# Patient Record
Sex: FEMALE
[De-identification: names, ages, dates, MRNs, and addresses within clinical notes are randomized per-mention and may not be internally consistent; named-entity substitution may affect disease eponyms.]

---

## 2017-08-14 ENCOUNTER — APPOINTMENT (OUTPATIENT)
Dept: OPHTHALMOLOGY | Facility: CLINIC | Age: 82
End: 2017-08-14
Payer: MEDICARE

## 2017-08-14 PROCEDURE — 92025 CPTRIZED CORNEAL TOPOGRAPHY: CPT

## 2017-08-14 PROCEDURE — 92002 INTRM OPH EXAM NEW PATIENT: CPT

## 2017-08-14 PROCEDURE — 92134 CPTRZ OPH DX IMG PST SGM RTA: CPT

## 2017-09-01 ENCOUNTER — APPOINTMENT (OUTPATIENT)
Dept: OPHTHALMOLOGY | Facility: CLINIC | Age: 82
End: 2017-09-01
Payer: SELF-PAY

## 2017-09-01 PROCEDURE — 92015A: CUSTOM

## 2017-09-11 ENCOUNTER — APPOINTMENT (OUTPATIENT)
Dept: OPHTHALMOLOGY | Facility: CLINIC | Age: 82
End: 2017-09-11
Payer: MEDICARE

## 2017-09-11 PROCEDURE — 92014 COMPRE OPH EXAM EST PT 1/>: CPT

## 2017-09-11 PROCEDURE — 92134 CPTRZ OPH DX IMG PST SGM RTA: CPT

## 2017-09-18 ENCOUNTER — APPOINTMENT (OUTPATIENT)
Dept: OPHTHALMOLOGY | Facility: CLINIC | Age: 82
End: 2017-09-18

## 2018-06-11 ENCOUNTER — APPOINTMENT (OUTPATIENT)
Dept: OPHTHALMOLOGY | Facility: CLINIC | Age: 83
End: 2018-06-11
Payer: MEDICARE

## 2018-06-11 DIAGNOSIS — H35.372 PUCKERING OF MACULA, LEFT EYE: ICD-10-CM

## 2018-06-11 DIAGNOSIS — H11.823 CONJUNCTIVOCHALASIS, BILATERAL: ICD-10-CM

## 2018-06-11 DIAGNOSIS — H35.371 PUCKERING OF MACULA, RIGHT EYE: ICD-10-CM

## 2018-06-11 DIAGNOSIS — Z96.1 PRESENCE OF INTRAOCULAR LENS: ICD-10-CM

## 2018-06-11 PROCEDURE — 92025 CPTRIZED CORNEAL TOPOGRAPHY: CPT

## 2018-06-11 PROCEDURE — 92012 INTRM OPH EXAM EST PATIENT: CPT

## 2019-05-17 ENCOUNTER — APPOINTMENT (OUTPATIENT)
Dept: OTOLARYNGOLOGY | Facility: CLINIC | Age: 84
End: 2019-05-17
Payer: MEDICARE

## 2019-05-17 VITALS
BODY MASS INDEX: 19.56 KG/M2 | HEIGHT: 64.5 IN | SYSTOLIC BLOOD PRESSURE: 103 MMHG | WEIGHT: 116 LBS | HEART RATE: 73 BPM | DIASTOLIC BLOOD PRESSURE: 64 MMHG

## 2019-05-17 PROCEDURE — 31575 DIAGNOSTIC LARYNGOSCOPY: CPT

## 2019-05-17 PROCEDURE — G0268 REMOVAL OF IMPACTED WAX MD: CPT

## 2019-05-17 PROCEDURE — 92557 COMPREHENSIVE HEARING TEST: CPT

## 2019-05-17 PROCEDURE — 99204 OFFICE O/P NEW MOD 45 MIN: CPT | Mod: 25

## 2019-05-17 RX ORDER — DOCUSATE SODIUM 100 MG/1
CAPSULE ORAL
Refills: 0 | Status: ACTIVE | COMMUNITY

## 2019-05-17 RX ORDER — LINACLOTIDE 72 UG/1
CAPSULE, GELATIN COATED ORAL
Refills: 0 | Status: ACTIVE | COMMUNITY

## 2019-05-17 RX ORDER — LORATADINE 5 MG
TABLET,CHEWABLE ORAL
Refills: 0 | Status: ACTIVE | COMMUNITY

## 2019-05-17 NOTE — PHYSICAL EXAM
[Midline] : trachea located in midline position [Normal] : no rashes [de-identified] : Hyoid bone palpable [de-identified] : right cerumen impaction - removed

## 2019-05-17 NOTE — CONSULT LETTER
[Dear  ___] : Dear  [unfilled], [Courtesy Letter:] : I had the pleasure of seeing your patient, [unfilled], in my office today. [Please see my note below.] : Please see my note below. [FreeTextEntry3] : Tripp Herman MD, JAROCHO, FACS\par  Department Otolaryngology\par Director of [facility]\par Professor of Otolaryngology, \par [employer]\par  [Sincerely,] : Sincerely, [Consult Closing:] : Thank you very much for allowing me to participate in the care of this patient.  If you have any questions, please do not hesitate to contact me.

## 2019-05-17 NOTE — REASON FOR VISIT
[Other: _____] : [unfilled] [Initial Evaluation] : an initial evaluation for [FreeTextEntry2] : former patient of Dr. Herman, here for dry cough, reflux

## 2019-05-17 NOTE — REVIEW OF SYSTEMS
[Post Nasal Drip] : post nasal drip [Hearing Loss] : hearing loss [Vertigo] : vertigo [Dizziness] : dizziness [Lightheadedness] : lightheadedness [Nasal Congestion] : nasal congestion [Hoarseness] : hoarseness [Throat Clearing] : throat clearing [Chills] : chills [Palpitations] : palpitations [Cough] : cough [Joint Pain] : joint pain [Shortness Of Breath] : shortness of breath [Easy Bruising] : a tendency for easy bruising [Negative] : Psychiatric [FreeTextEntry1] : dysphagia, headache, fatigue, daytime sleepiness, sweating at night , feel cooler than others

## 2019-05-17 NOTE — DATA REVIEWED
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 tympanograms

## 2019-05-17 NOTE — PROCEDURE
[Risk and Benefits Discussed] : The purpose, risks, discomforts, benefits and alternatives of the procedure have been explained to the patient including no treatment. [Cerumen Impaction] : Cerumen Impaction [] : Removal of Cerumen [Topical Lidocaine] : topical lidocaine [Image(s) Captured] : image(s) captured and filed [Oxymetazoline HCl] : oxymetazoline HCl [Flexible Endoscope] : examined with the flexible endoscope [Serial Number: ___] : Serial Number: [unfilled] [Normal] : the false vocal folds were pink and regular, the ventricular sulcus was open, the true vocal folds were glistening white, tense and of equal length, mobility, and height [True Vocal Cords Erythematous] : no true vocal cord edema [True Vocal Cords Paralysis] : no true vocal cord paralysis [True Vocal Cords Moura's Nodules] : no true vocal cord nodules [Glottis Arytenoid Cartilages] : no arytenoid granulomas [Glottis Arytenoid Cartilages Erythema] : no arytenoid erythema [de-identified] : Patient was placed in the examination chair in a sitting position. The nose was decongested with oxymetazoline nasal solution. The airway was anesthetized with 4% Xylocaine.  The back of the throat was anesthetized with Cetacaine. Direct flexible/rigid video endoscopy was performed. Findings revealed:\par Patient's nasal septum essentially midline nasal mucosa was drawn. Nasopharynx clear again mucosa dry larynx vocal cords mobile no recurrent laryngeal cyst epiglottis normal vocal cords normal mucosa dry [de-identified] : dry cough [FreeTextEntry3] : mucosa dry [de-identified] : mucosa dry [FreeTextEntry1] : Patient with decreased hearing as well as vertigo examination showed cerumen in the right ear this was cleared. [FreeTextEntry6] : Patient seen with decreased hearing vertigo exam showed cerumen impaction the right ear which is now cleared utilizing magnification you speculum and wax loop

## 2019-05-17 NOTE — HISTORY OF PRESENT ILLNESS
[de-identified] : 87 year old female former patient of Dr. Herman, here for dry cough, reflux.  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  States has had a dry cough for the past year.  States has clear rhinorrhea in the morning for the past few months.  States has post nasal drip, constantly clearing throat.  States sleeping on 3 pillows.   Denies sinus infections in the past year.   Denies nasal congestion, sinus pain, sinus pressure.  States receiving shots every 4 months for migraines, with good relief.   States has reflux for years, unable to take anti reflux medications due to drying her up, exacerbating constipation issues.  S/p excision of thyroid nodule more than 10 years ago.

## 2020-04-06 ENCOUNTER — APPOINTMENT (OUTPATIENT)
Dept: OPHTHALMOLOGY | Facility: CLINIC | Age: 85
End: 2020-04-06

## 2020-05-22 ENCOUNTER — APPOINTMENT (OUTPATIENT)
Dept: OTOLARYNGOLOGY | Facility: CLINIC | Age: 85
End: 2020-05-22
Payer: MEDICARE

## 2020-05-22 VITALS
DIASTOLIC BLOOD PRESSURE: 76 MMHG | SYSTOLIC BLOOD PRESSURE: 128 MMHG | HEIGHT: 64 IN | WEIGHT: 111 LBS | BODY MASS INDEX: 18.95 KG/M2 | TEMPERATURE: 97.6 F | HEART RATE: 85 BPM

## 2020-05-22 DIAGNOSIS — R04.0 EPISTAXIS: ICD-10-CM

## 2020-05-22 DIAGNOSIS — K21.9 GASTRO-ESOPHAGEAL REFLUX DISEASE W/OUT ESOPHAGITIS: ICD-10-CM

## 2020-05-22 DIAGNOSIS — Z87.59 PERSONAL HISTORY OF OTHER COMPLICATIONS OF PREGNANCY, CHILDBIRTH AND THE PUERPERIUM: ICD-10-CM

## 2020-05-22 DIAGNOSIS — R05 COUGH: ICD-10-CM

## 2020-05-22 PROCEDURE — 99214 OFFICE O/P EST MOD 30 MIN: CPT | Mod: 25

## 2020-05-22 PROCEDURE — 92567 TYMPANOMETRY: CPT

## 2020-05-22 PROCEDURE — 92557 COMPREHENSIVE HEARING TEST: CPT

## 2020-05-22 RX ORDER — GUAIFENESIN 600 MG/1
600 TABLET, EXTENDED RELEASE ORAL
Qty: 120 | Refills: 4 | Status: DISCONTINUED | COMMUNITY
Start: 2019-05-17 | End: 2020-05-22

## 2020-05-22 RX ORDER — METOPROLOL TARTRATE 75 MG/1
TABLET, FILM COATED ORAL
Refills: 0 | Status: DISCONTINUED | COMMUNITY
End: 2020-05-22

## 2020-05-22 NOTE — DATA REVIEWED
[de-identified] : Right ear mild to severe sensorineural hearing loss left ear moderate to severe sensorineural hearing loss tympanograms Type A right ear discrimination 72% left ear discrimination 92%a

## 2020-05-22 NOTE — PROCEDURE
[Image(s) Captured] : image(s) captured and filed [Oxymetazoline HCl] : oxymetazoline HCl [Topical Lidocaine] : topical lidocaine [Flexible Endoscope] : examined with the flexible endoscope [Serial Number: ___] : Serial Number: [unfilled] [Risk and Benefits Discussed] : The purpose, risks, discomforts, benefits and alternatives of the procedure have been explained to the patient including no treatment. [Cerumen Impaction] : Cerumen Impaction [] : Removal of Cerumen [FreeTextEntry1] : Patient with otalgia and hearing loss.\par \par Cerumen impaction left greater than right [FreeTextEntry4] : Patient placed in extension sitting position cerumen removed utilizing magnification and speculum wax loop and irrigation

## 2020-05-22 NOTE — HISTORY OF PRESENT ILLNESS
[Hearing Loss] : hearing loss [de-identified] : 88 year old female presents for annual hearing test. Pt admits to no change in hearing  and having recurrent episodes of vertigo managed by otc medication with season change, pt denies tinnitus and discharge from ears. \par Pt has nasal dryness and recurrent streaks/drops of blood in mucous. Pt states she has used A& D ointment in nose as needed which has only helped mildly. Has not used HA since Covid 19.

## 2020-05-22 NOTE — PHYSICAL EXAM
[Midline] : trachea located in midline position [Normal] : no rashes [FreeTextEntry1] : dry throat, epistaxis [de-identified] : Hyoid bone palpable [de-identified] : bilateral cerumen impaction - removed

## 2020-05-22 NOTE — CONSULT LETTER
[Consult Letter:] : I had the pleasure of evaluating your patient, [unfilled]. [Dear  ___] : Dear  [unfilled], [Please see my note below.] : Please see my note below. [Consult Closing:] : Thank you very much for allowing me to participate in the care of this patient.  If you have any questions, please do not hesitate to contact me. [Sincerely,] : Sincerely, [FreeTextEntry3] : Tripp Herman MD, JAROCHO, FACS\par  Department Otolaryngology\par Director of [facility]\par Professor of Otolaryngology, \par [employer]\par

## 2020-05-22 NOTE — REASON FOR VISIT
[Subsequent Evaluation] : a subsequent evaluation for [Hearing Loss] : hearing loss [FreeTextEntry2] : nasal dryness and vertigo, left sided epistaxis, otalgia

## 2020-07-09 ENCOUNTER — APPOINTMENT (OUTPATIENT)
Dept: OPHTHALMOLOGY | Facility: CLINIC | Age: 85
End: 2020-07-09
Payer: MEDICARE

## 2020-07-09 ENCOUNTER — NON-APPOINTMENT (OUTPATIENT)
Age: 85
End: 2020-07-09

## 2020-07-09 PROCEDURE — 99441: CPT

## 2020-12-29 ENCOUNTER — APPOINTMENT (OUTPATIENT)
Dept: OPHTHALMOLOGY | Facility: CLINIC | Age: 85
End: 2020-12-29
Payer: MEDICARE

## 2020-12-29 ENCOUNTER — NON-APPOINTMENT (OUTPATIENT)
Age: 85
End: 2020-12-29

## 2020-12-29 PROCEDURE — 99072 ADDL SUPL MATRL&STAF TM PHE: CPT

## 2020-12-29 PROCEDURE — 92012 INTRM OPH EXAM EST PATIENT: CPT

## 2021-07-21 ENCOUNTER — APPOINTMENT (OUTPATIENT)
Dept: OTOLARYNGOLOGY | Facility: CLINIC | Age: 86
End: 2021-07-21
Payer: MEDICARE

## 2021-07-21 VITALS
HEART RATE: 124 BPM | BODY MASS INDEX: 19.12 KG/M2 | WEIGHT: 112 LBS | DIASTOLIC BLOOD PRESSURE: 80 MMHG | HEIGHT: 64 IN | SYSTOLIC BLOOD PRESSURE: 129 MMHG

## 2021-07-21 PROCEDURE — G0268 REMOVAL OF IMPACTED WAX MD: CPT

## 2021-07-21 PROCEDURE — 99072 ADDL SUPL MATRL&STAF TM PHE: CPT

## 2021-07-21 PROCEDURE — 99214 OFFICE O/P EST MOD 30 MIN: CPT | Mod: 25

## 2021-07-21 PROCEDURE — 92557 COMPREHENSIVE HEARING TEST: CPT

## 2021-07-21 PROCEDURE — 92567 TYMPANOMETRY: CPT

## 2021-07-21 RX ORDER — GUAIFENESIN 1200 MG/1
1200 TABLET, EXTENDED RELEASE ORAL
Qty: 180 | Refills: 0 | Status: DISCONTINUED | COMMUNITY
Start: 2020-05-22 | End: 2021-07-21

## 2021-07-21 RX ORDER — MUPIROCIN 20 MG/G
2 OINTMENT TOPICAL TWICE DAILY
Qty: 1 | Refills: 2 | Status: DISCONTINUED | COMMUNITY
Start: 2020-05-22 | End: 2021-07-21

## 2021-07-21 NOTE — PHYSICAL EXAM
[Midline] : trachea located in midline position [Normal] : no rashes [FreeTextEntry1] : PND [de-identified] : Hyoid bone palpable [de-identified] : bilateral cerumen impaction - removed

## 2021-07-21 NOTE — PROCEDURE
[Risk and Benefits Discussed] : The purpose, risks, discomforts, benefits and alternatives of the procedure have been explained to the patient including no treatment. [Cerumen Impaction] : Cerumen Impaction [] : Removal of Cerumen [FreeTextEntry1] : patient wears bilateral hearing aids physical exam showed cerumen impaction [FreeTextEntry6] : cerumen removed with magnification a speculum wax loop followed by irrigation

## 2021-07-21 NOTE — HISTORY OF PRESENT ILLNESS
[Hearing Loss] : hearing loss [Otalgia] : otalgia [Vertigo] : vertigo [de-identified] : 89 year old female follow up hearing check, clogged ears.  States has bilateral hearing aids, doesn't wear when wearing a mask as when she takes off the mask, the hearing aid comes out.  States has dizziness and vertigo, doesn't take any medication.  Patient denies otalgia, otorrhea, ear infections, tinnitus.  Last audio 5/22/20.\par States no problems with her nose. \par

## 2021-07-21 NOTE — REASON FOR VISIT
[Subsequent Evaluation] : a subsequent evaluation for [FreeTextEntry2] : follow up hearing check, clogged ears

## 2021-09-22 ENCOUNTER — APPOINTMENT (OUTPATIENT)
Dept: OTOLARYNGOLOGY | Facility: CLINIC | Age: 86
End: 2021-09-22
Payer: MEDICARE

## 2021-09-22 VITALS
WEIGHT: 112 LBS | HEART RATE: 80 BPM | DIASTOLIC BLOOD PRESSURE: 76 MMHG | HEIGHT: 64 IN | BODY MASS INDEX: 19.12 KG/M2 | SYSTOLIC BLOOD PRESSURE: 127 MMHG

## 2021-09-22 PROCEDURE — 99214 OFFICE O/P EST MOD 30 MIN: CPT | Mod: 25

## 2021-09-22 PROCEDURE — 92557 COMPREHENSIVE HEARING TEST: CPT

## 2021-09-22 PROCEDURE — 92567 TYMPANOMETRY: CPT

## 2021-09-22 PROCEDURE — G0268 REMOVAL OF IMPACTED WAX MD: CPT

## 2021-09-22 NOTE — PHYSICAL EXAM
[Midline] : trachea located in midline position [Normal] : no rashes [FreeTextEntry1] : PND, Sudden changing in hearing [de-identified] : Hyoid bone palpable [de-identified] : bilateral cerumen impaction - removed

## 2021-09-22 NOTE — DATA REVIEWED
[de-identified] : Right ear mild to severe sensorineural hearing loss left ear mild/moderate to severe sensorineural hearing loss essentially unchanged except for a slight decrease in discrimination of the right ear [de-identified] : MRI 3T brain mild chronic vessel ischemic changes progressed from prior study

## 2021-09-22 NOTE — PROCEDURE
VALORIE Fernández is a 4 month old female who is accompanied by:mother      Well Child 4 Month     Concerns today: She has a rash on her back, arms and legs. Also looks like she has cradle cap. Mom has been bathing her every other night. If she bathes more often, her skin appears worse. Using an aquaphor body wash and aquaphor nightly. Brother has eczema too.     Diet:   Formula:   Harmon   Quantity: 22-25 oz      Sleep: 9-9  Sleep Location: [facility] in parents room.      Tummy Time: Does great with it!      Elimination: BM - daily      Milestones:  Leaning/rolling  Hands in mouth   Screeching  Smiling, giggling     Review of Systems   Constitutional: Negative.    HENT: Negative.    Eyes: Negative.    Respiratory: Negative.    Cardiovascular: Negative.    Gastrointestinal: Negative.    Skin: Positive for rash.   Hematological: Negative.           Objective   Visit Vitals  Temp 97.8 °F (36.6 °C) (Temporal)   Ht 25.5\" (64.8 cm)   Wt 6.915 kg (15 lb 3.9 oz)   HC 42.5 cm (16.73\")   BMI 16.48 kg/m²   Growth parameters are noted and are appropriate for age.     Physical Exam   Constitutional: She appears well-developed and well-nourished. She is active.   HENT:   Head: Normocephalic. Anterior fontanelle is flat.   Right Ear: Pinna normal.   Left Ear: Pinna normal.   Nose: Nose normal. No nasal discharge.   Mouth/Throat: Mucous membranes are moist. Oropharynx is clear.   Eyes: EOM are normal. Red reflex is present bilaterally. Visual tracking is normal.   Neck: Full passive range of motion without pain. Neck supple.   Cardiovascular: Normal rate, regular rhythm, S1 normal and S2 normal.   No murmur heard.  Pulses:       Femoral pulses are 2+ on the right side, and 2+ on the left side.  Pulmonary/Chest: Effort normal and breath sounds normal. There is normal air entry. No accessory muscle usage.   Abdominal: Soft. Bowel sounds are normal. She exhibits no distension and no mass. There is no hepatosplenomegaly. There is no  tenderness.   Genitourinary: No labial rash. No labial fusion.   Lymphadenopathy:     She has no cervical adenopathy.   Neurological: She is alert. She has normal strength. She displays no abnormal primitive reflexes. No cranial nerve deficit. Symmetric [place].   Skin: Skin is warm.   Diffuse Erythematous rough patches on the back, scalp and extremities    Vitals reviewed.       Screening tests:   ASQ Scores  Communication: 60; Normal  Gross Motor: 55; Normal  Did not complete the rest of the ASQ      Assessment   Problem List Items Addressed This Visit        Other    Encounter for routine child health examination without abnormal findings - Primary      Other Visit Diagnoses     Need for vaccination        Relevant Orders    DTAP HIB IPV VACC 0 THRU 4 YRS (PENTACEL) (Completed)    PNEUMOCOCCAL CONJUGATE 13 VALENT VACC(PREVNAR-13) (Completed)    ROTAVIRUS MONOVALENT VACC 2 DOSE(ROTARIX) (Completed)    Infantile atopic dermatitis        Relevant Medications    fluocinolone acetonide body 0.01 % external oil           Recommendations:   Eczema   Recommend Limit bath time to 10 minutes and use soap at the end of the bath. Bath with scented free gentle soaps.   Moisturize twice a day with an emollient: Vaseline, Aquaphor, or Eucerin  Apply prescribed steroid ointment on affected area twice a day until skin feels smooth. If fluocinolone oil is not covered by insurance, will send triamcinolone 0.025% ointment     Continue Tummy Time  Always keep your hand on the baby if they are up on a table or bed to prevent from rolling off.   OK to offer tastes of solids now  Pentacel, prevnar, Rotarix given today  Follow-up for the 6 month well child visit, or sooner as needed.           [Risk and Benefits Discussed] : The purpose, risks, discomforts, benefits and alternatives of the procedure have been explained to the patient including no treatment. [Cerumen Impaction] : Cerumen Impaction [Sudden Hearing Loss] : Sudden Hearing Loss [] : Removal of Cerumen [FreeTextEntry1] : Patient complains of sudden change of hearing.  Physical exam showed slight cerumen impaction which was cleared [FreeTextEntry6] : Patient placed in exam chair in sitting position cerumen removed with magnification ear speculum and wax loop

## 2021-09-22 NOTE — REASON FOR VISIT
[Subsequent Evaluation] : a subsequent evaluation for [FreeTextEntry2] : follow up bilateral hearing loss, right ear feels hearing has declined

## 2021-11-30 ENCOUNTER — NON-APPOINTMENT (OUTPATIENT)
Age: 86
End: 2021-11-30

## 2021-11-30 ENCOUNTER — APPOINTMENT (OUTPATIENT)
Dept: OPHTHALMOLOGY | Facility: CLINIC | Age: 86
End: 2021-11-30
Payer: MEDICARE

## 2021-11-30 PROCEDURE — 92012 INTRM OPH EXAM EST PATIENT: CPT

## 2022-03-16 ENCOUNTER — APPOINTMENT (OUTPATIENT)
Dept: OTOLARYNGOLOGY | Facility: CLINIC | Age: 87
End: 2022-03-16
Payer: MEDICARE

## 2022-03-16 VITALS
HEART RATE: 85 BPM | WEIGHT: 112 LBS | SYSTOLIC BLOOD PRESSURE: 113 MMHG | HEIGHT: 64.5 IN | BODY MASS INDEX: 18.89 KG/M2 | DIASTOLIC BLOOD PRESSURE: 74 MMHG

## 2022-03-16 DIAGNOSIS — J34.89 OTHER SPECIFIED DISORDERS OF NOSE AND NASAL SINUSES: ICD-10-CM

## 2022-03-16 DIAGNOSIS — R42 DIZZINESS AND GIDDINESS: ICD-10-CM

## 2022-03-16 DIAGNOSIS — Z87.19 PERSONAL HISTORY OF OTHER DISEASES OF THE DIGESTIVE SYSTEM: ICD-10-CM

## 2022-03-16 DIAGNOSIS — H35.371 PUCKERING OF MACULA, RIGHT EYE: ICD-10-CM

## 2022-03-16 DIAGNOSIS — Z86.69 PERSONAL HISTORY OF OTHER DISEASES OF THE NERVOUS SYSTEM AND SENSE ORGANS: ICD-10-CM

## 2022-03-16 DIAGNOSIS — M81.0 AGE-RELATED OSTEOPOROSIS W/OUT CURRENT PATHOLOGICAL FRACTURE: ICD-10-CM

## 2022-03-16 DIAGNOSIS — R09.82 POSTNASAL DRIP: ICD-10-CM

## 2022-03-16 PROCEDURE — 92567 TYMPANOMETRY: CPT

## 2022-03-16 PROCEDURE — 99215 OFFICE O/P EST HI 40 MIN: CPT | Mod: 25

## 2022-03-16 PROCEDURE — 92557 COMPREHENSIVE HEARING TEST: CPT

## 2022-03-16 PROCEDURE — G0268 REMOVAL OF IMPACTED WAX MD: CPT

## 2022-03-16 RX ORDER — AZELASTINE HYDROCHLORIDE 137 UG/1
0.1 SPRAY, METERED NASAL TWICE DAILY
Qty: 1 | Refills: 6 | Status: COMPLETED | COMMUNITY
Start: 2021-09-22 | End: 2022-03-16

## 2022-03-16 RX ORDER — METOPROLOL TARTRATE 75 MG/1
TABLET, FILM COATED ORAL
Refills: 0 | Status: COMPLETED | COMMUNITY
End: 2022-03-16

## 2022-03-16 NOTE — PHYSICAL EXAM
[Midline] : trachea located in midline position [Normal] : no rashes [FreeTextEntry1] : Vertigo [de-identified] : Hyoid bone palpable [de-identified] : bilateral cerumen impaction - removed, right ear canal was swollen and erythematous

## 2022-03-16 NOTE — REASON FOR VISIT
[Subsequent Evaluation] : a subsequent evaluation for [Ear Pain] : ear pain [Hearing Loss] : hearing loss [Vertigo] : vertigo [FreeTextEntry2] : bilateral hearing loss and vertigo

## 2022-03-16 NOTE — PROCEDURE
[Risk and Benefits Discussed] : The purpose, risks, discomforts, benefits and alternatives of the procedure have been explained to the patient including no treatment. [Cerumen Impaction] : Cerumen Impaction [] : Removal of Cerumen [FreeTextEntry1] : Patient complaint of vertigo physical exam showed complete bilateral cerumen impaction patient also states that her right ear periodically bleeds [FreeTextEntry4] : Patient placed exam chair. Cerumen removed with a combination of magnification ear speculum wax curet irrigation. The right external canal was erythematous and thickened.

## 2022-03-16 NOTE — CONSULT LETTER
[Dear  ___] : Dear  [unfilled], [Courtesy Letter:] : I had the pleasure of seeing your patient, [unfilled], in my office today. [Please see my note below.] : Please see my note below. [Referral Closing:] : Thank you very much for seeing this patient.  If you have any questions, please do not hesitate to contact me. [Sincerely,] : Sincerely, [FreeTextEntry2] : Dr Pope [FreeTextEntry3] : Tripp Herman MD, JAROCHO, FACS\par  Department Otolaryngology\par Director of [facility]\par Professor of Otolaryngology,\par [employer] [facility] [employer]

## 2022-03-16 NOTE — HISTORY OF PRESENT ILLNESS
[Ear Fullness] : ear fullness [Tinnitus] : tinnitus [Hearing Loss] : hearing loss [Vertigo] : vertigo [de-identified] : 89 year old female presents for follow up for bilateral hearing loss and vertigo. States at times cleans her right ear with washcloth occasionally has blood, and hearing has remained the same since last clinical visit 10433.States had recent vertigo episode a week ago had shortness of breath, lasted couple of seconds, and resolved on its own. Patient denies otalgia, otorrhea, ear infections,, tinnitus, or headaches. Since this is an MRI of the head the neurologist did not put anything abnormal. Patient had a workup previously as other physicians offices in the past records are not available patient does have a cardiac history is already had an ablation.

## 2022-07-03 ENCOUNTER — APPOINTMENT (OUTPATIENT)
Dept: ORTHOPEDIC SURGERY | Facility: CLINIC | Age: 87
End: 2022-07-03

## 2022-07-03 VITALS — BODY MASS INDEX: 18.66 KG/M2 | WEIGHT: 112 LBS | HEIGHT: 65 IN

## 2022-07-03 DIAGNOSIS — M70.61 TROCHANTERIC BURSITIS, RIGHT HIP: ICD-10-CM

## 2022-07-03 DIAGNOSIS — M25.551 PAIN IN RIGHT HIP: ICD-10-CM

## 2022-07-03 DIAGNOSIS — M54.16 RADICULOPATHY, LUMBAR REGION: ICD-10-CM

## 2022-07-03 DIAGNOSIS — M25.571 PAIN IN RIGHT ANKLE AND JOINTS OF RIGHT FOOT: ICD-10-CM

## 2022-07-03 DIAGNOSIS — S93.491A SPRAIN OF OTHER LIGAMENT OF RIGHT ANKLE, INITIAL ENCOUNTER: ICD-10-CM

## 2022-07-03 DIAGNOSIS — M65.251 CALCIFIC TENDINITIS, RIGHT THIGH: ICD-10-CM

## 2022-07-03 PROCEDURE — J3490M: CUSTOM

## 2022-07-03 PROCEDURE — 99203 OFFICE O/P NEW LOW 30 MIN: CPT | Mod: 25

## 2022-07-03 PROCEDURE — 20610 DRAIN/INJ JOINT/BURSA W/O US: CPT

## 2022-07-03 PROCEDURE — 73610 X-RAY EXAM OF ANKLE: CPT | Mod: RT

## 2022-07-03 PROCEDURE — 73502 X-RAY EXAM HIP UNI 2-3 VIEWS: CPT | Mod: RT

## 2022-07-03 NOTE — ASSESSMENT
[FreeTextEntry1] : Xrays reviewed with patient\par Treatment options discussed\par GT bursa injection done today, tolerated well\par Recommend course of PT/HEP\par Follow up in 3 weeks with Dr. Perea

## 2022-07-03 NOTE — PHYSICAL EXAM
[NL (120)] : flexion 120 degrees [NL (30)] : extension 30 degrees [FreeTextEntry9] : GT Calcific deposits [5___] : plantar flexion 5[unfilled]/5 [2+] : posterior tibialis pulse: 2+ [Right] : right ankle [] : patient ambulates without assistive device [There are no fractures, subluxations or dislocations. No significant abnormalities are seen] : There are no fractures, subluxations or dislocations. No significant abnormalities are seen [Degenerative change] : Degenerative change [de-identified] : plantar flexion 20 degrees [TWNoteComboBox7] : dorsiflexion 10 degrees

## 2022-07-03 NOTE — PROCEDURE
[Large Joint Injection] : Large joint injection [Right] : of the right [Greater Trochanteric Bursa] : greater trochanteric bursa [Pain] : pain [Alcohol] : alcohol [Inflammation] : inflammation [Betadine] : betadine [Ethyl Chloride sprayed topically] : ethyl chloride sprayed topically [Sterile technique used] : sterile technique used [___ cc    3mg] :  Betamethasone (Celestone) ~Vcc of 3mg [___ cc    1%] : Lidocaine ~Vcc of 1%  [___ cc    0.25%] : Bupivacaine (Marcaine) ~Vcc of 0.25%  [Call if redness, pain or fever occur] : call if redness, pain or fever occur [Apply ice for 15min out of every hour for the next 12-24 hours as tolerated] : apply ice for 15 minutes out of every hour for the next 12-24 hours as tolerated [Patient was advised to rest the joint(s) for ____ days] : patient was advised to rest the joint(s) for [unfilled] days [Patient had decreased mobility in the joint] : patient had decreased mobility in the joint [Previous OTC use and PT nontherapeutic] : patient has tried OTC's including aspirin, Ibuprofen, Aleve, etc or prescription NSAIDS, and/or exercises at home and/or physical therapy without satisfactory response [Risks, benefits, alternatives discussed / Verbal consent obtained] : the risks benefits, and alternatives have been discussed, and verbal consent was obtained

## 2022-07-03 NOTE — HISTORY OF PRESENT ILLNESS
[9] : 9 [de-identified] : 7/3/22: Patient is a 89 yo female c/o right ankle and right hip pain for 5 days after doing a lot of walking. No n/t. Not taking any medication for pain. Pain is worse with walking. No previous injuries or surgeries to right ankle and right hip.  [FreeTextEntry5] : pt states she walked a lot last week, pt c.o pain in rt leg

## 2022-07-28 ENCOUNTER — APPOINTMENT (OUTPATIENT)
Dept: ORTHOPEDIC SURGERY | Facility: CLINIC | Age: 87
End: 2022-07-28

## 2022-07-28 VITALS — WEIGHT: 112 LBS | BODY MASS INDEX: 18.66 KG/M2 | HEIGHT: 65 IN

## 2022-07-28 DIAGNOSIS — M48.02 SPINAL STENOSIS, CERVICAL REGION: ICD-10-CM

## 2022-07-28 DIAGNOSIS — M50.20 OTHER CERVICAL DISC DISPLACEMENT, UNSPECIFIED CERVICAL REGION: ICD-10-CM

## 2022-07-28 PROCEDURE — 99213 OFFICE O/P EST LOW 20 MIN: CPT

## 2022-07-28 NOTE — DATA REVIEWED
[MRI] : MRI [Cervical Spine] : cervical spine [Report was reviewed and noted in the chart] : The report was reviewed and noted in the chart [FreeTextEntry1] : straightening of the cervical lordosis.\par Multilevel HNP and degenerative spondylosis. No high grade spinal canal stenosis.

## 2022-07-28 NOTE — HISTORY OF PRESENT ILLNESS
[4] : 4 [2] : 2 [Dull/Aching] : dull/aching [Intermittent] : intermittent [Rest] : rest [FreeTextEntry5] : pt woke up feeling pain in the right leg 3 weeks ago. pt feels discomfort right leg.  the left ankle is swollen. pt had a curtisone injection and it helped with the pain in  the right leg.  [de-identified] : motion

## 2022-07-28 NOTE — REASON FOR VISIT
[FreeTextEntry2] : 89yo female with neck and right trapezius pain. She radiating arm pain, numbness and tingling. She also saw JENIFFER Brand on 7/3 for her right ankle which is still hurting her and her right hip for which she got a greater troch injection which alleviated her pain.

## 2022-07-28 NOTE — DISCUSSION/SUMMARY
[de-identified] : General Dx discussion\par The patient was advised of the diagnosis. The natural history of the pathology was explained in full to the patient in layman's terms. All questions were answered. The risks and benefits of surgical and non-surgical treatment alternatives were explained in full to the patient. \par \par She will be sent for a course of PT.\par Consult with pain management in 6 weeks if pain persists for possible injections.\par \par Entered by ANTONY Jackman acting as scribe.\par \par -\par The documentation recorded by the scribe accurately reflects the service I personally performed and the decisions made by me.\par \par

## 2022-09-08 ENCOUNTER — APPOINTMENT (OUTPATIENT)
Dept: PAIN MANAGEMENT | Facility: CLINIC | Age: 87
End: 2022-09-08

## 2022-09-08 VITALS — HEIGHT: 65 IN | BODY MASS INDEX: 18.66 KG/M2 | WEIGHT: 112 LBS

## 2022-09-08 DIAGNOSIS — M47.812 SPONDYLOSIS W/OUT MYELOPATHY OR RADICULOPATHY, CERVICAL REGION: ICD-10-CM

## 2022-09-08 DIAGNOSIS — M54.2 CERVICALGIA: ICD-10-CM

## 2022-09-08 PROCEDURE — 99204 OFFICE O/P NEW MOD 45 MIN: CPT

## 2022-09-08 NOTE — CONSULT LETTER
[Dear  ___] : Dear  [unfilled], [Consult Letter:] : I had the pleasure of evaluating your patient, [unfilled]. [Please see my note below.] : Please see my note below. [Consult Closing:] : Thank you very much for allowing me to participate in the care of this patient.  If you have any questions, please do not hesitate to contact me. [Sincerely,] : Sincerely, [FreeTextEntry3] : Miki Bassett MD\par

## 2022-09-08 NOTE — PHYSICAL EXAM
[Normal Coordination] : normal coordination [Normal DTR UE/LE] : normal DTR UE/LE  [Normal Sensation] : normal sensation [Normal Mood and Affect] : normal mood and affect [Able to Communicate] : able to communicate [Normal Skin] : normal skin [No Rash] : no rash [No Ulcers] : no ulcers [No Lesions] : no lesions [No obvious lymphadenopathy in areas examined] : no obvious lymphadenopathy in areas examined [Well Developed] : well developed [Well Nourished] : well nourished [No Respiratory Distress] : no respiratory distress [] : motor exam is non-focal throughout both upper extremities

## 2022-09-08 NOTE — HISTORY OF PRESENT ILLNESS
[Neck] : neck [10] : 10 [Dull/Aching] : dull/aching [Sharp] : sharp [Shooting] : shooting [Throbbing] : throbbing [Intermittent] : intermittent [Rest] : rest [Heat] : heat [Sitting] : sitting [Standing] : standing [Walking] : walking [FreeTextEntry1] : Neck pain without radiation, L >> R, axial pain. Pain began approx. > 3 months ago sudden onset but she has had long standing neck pain worsened in the last 3 months. Started PT she says worsened it. No prior cervical surgery or injections. MRI reviewed.  [] : no [FreeTextEntry7] : LEFT SHOULDER AND UPPER BACK

## 2022-09-08 NOTE — PROCEDURE
[Trigger point 1-2 muscle groups] : trigger point 1-2 muscle groups [Bilateral] : bilaterally of the [Cervical paraspinal muscle] : cervical paraspinal muscle [Trapezius muscle] : trapezius muscle [Pain] : pain [Inflammation] : inflammation [Alcohol] : alcohol [Ethyl Chloride sprayed topically] : ethyl chloride sprayed topically [Sterile technique used] : sterile technique used [___ cc    1%] : Lidocaine ~Vcc of 1%  [___ cc    0.25%] : Bupivacaine (Marcaine) ~Vcc of 0.25%  [___ cc    10mg] : Triamcinolone (Kenalog) ~Vcc of 10 mg

## 2022-09-14 ENCOUNTER — APPOINTMENT (OUTPATIENT)
Dept: OTOLARYNGOLOGY | Facility: CLINIC | Age: 87
End: 2022-09-14

## 2022-09-14 VITALS
DIASTOLIC BLOOD PRESSURE: 68 MMHG | WEIGHT: 112 LBS | BODY MASS INDEX: 18.66 KG/M2 | HEART RATE: 78 BPM | SYSTOLIC BLOOD PRESSURE: 119 MMHG | HEIGHT: 65 IN

## 2022-09-14 DIAGNOSIS — Z80.0 FAMILY HISTORY OF MALIGNANT NEOPLASM OF DIGESTIVE ORGANS: ICD-10-CM

## 2022-09-14 DIAGNOSIS — Z83.511 FAMILY HISTORY OF GLAUCOMA: ICD-10-CM

## 2022-09-14 PROCEDURE — G0268 REMOVAL OF IMPACTED WAX MD: CPT

## 2022-09-14 PROCEDURE — 99214 OFFICE O/P EST MOD 30 MIN: CPT | Mod: 25

## 2022-09-14 PROCEDURE — 92557 COMPREHENSIVE HEARING TEST: CPT

## 2022-09-14 PROCEDURE — 92567 TYMPANOMETRY: CPT

## 2022-09-14 RX ORDER — METOPROLOL TARTRATE 75 MG/1
TABLET, FILM COATED ORAL
Refills: 0 | Status: COMPLETED | COMMUNITY
End: 2022-09-14

## 2022-09-14 RX ORDER — CIPROFLOXACIN AND DEXAMETHASONE 3; 1 MG/ML; MG/ML
0.3-0.1 SUSPENSION/ DROPS AURICULAR (OTIC) TWICE DAILY
Qty: 1 | Refills: 4 | Status: COMPLETED | COMMUNITY
Start: 2022-03-16 | End: 2022-09-14

## 2022-09-26 PROBLEM — Z83.511 FAMILY HISTORY OF GLAUCOMA: Status: ACTIVE | Noted: 2017-08-14

## 2022-09-26 NOTE — PROCEDURE
[Image(s) Captured] : image(s) captured and filed [Video Captured] : video captured and filed [Topical Lidocaine] : topical lidocaine [Oxymetazoline HCl] : oxymetazoline HCl [Flexible Endoscope] : examined with the flexible endoscope [Risk and Benefits Discussed] : The purpose, risks, discomforts, benefits and alternatives of the procedure have been explained to the patient including no treatment. [Cerumen Impaction] : Cerumen Impaction [] : Removal of Cerumen [FreeTextEntry1] : Patient complains of increased clogged ear sensation vertigo found to have bilateral cerumen impaction [FreeTextEntry6] : Cerumen removed with combination of irrigation magnification ear speculum and wax loop

## 2022-09-26 NOTE — PHYSICAL EXAM
[Midline] : trachea located in midline position [Normal] : no rashes [FreeTextEntry1] : Vertigo [de-identified] : Hyoid bone palpable [de-identified] : Bilateral cerumen impaction removed

## 2022-09-26 NOTE — HISTORY OF PRESENT ILLNESS
[Hearing Loss] : hearing loss [Ear Fullness] : ear fullness [Otorrhea] : otorrhea [de-identified] : 89 year old female presents with bleeding in left ear and right ear fullness.  Reports one episode of bleeding in the left ear 2 days ago while cleaning with a q-tip. States used ear drops with relief to stop the bleeding, unsure of medication name. Reports constant right ear fullness. Patient denies otalgia, otorrhea, ear infections, tinnitus. Reports intermittent post nasal drip. Denies nasal congestion, nasal discharge, sinus pressure/pain, or recent fevers.

## 2022-09-26 NOTE — REASON FOR VISIT
[Subsequent Evaluation] : a subsequent evaluation for [FreeTextEntry2] : Bleeding in left ear and right ear fullness

## 2022-09-26 NOTE — DATA REVIEWED
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

## 2022-10-27 ENCOUNTER — APPOINTMENT (OUTPATIENT)
Dept: PAIN MANAGEMENT | Facility: CLINIC | Age: 87
End: 2022-10-27

## 2023-02-13 ENCOUNTER — APPOINTMENT (OUTPATIENT)
Dept: OTOLARYNGOLOGY | Facility: CLINIC | Age: 88
End: 2023-02-13

## 2023-03-01 ENCOUNTER — APPOINTMENT (OUTPATIENT)
Dept: OTOLARYNGOLOGY | Facility: CLINIC | Age: 88
End: 2023-03-01
Payer: MEDICARE

## 2023-03-01 VITALS — SYSTOLIC BLOOD PRESSURE: 129 MMHG | HEART RATE: 88 BPM | DIASTOLIC BLOOD PRESSURE: 78 MMHG

## 2023-03-01 PROCEDURE — 31231 NASAL ENDOSCOPY DX: CPT

## 2023-03-01 PROCEDURE — 92557 COMPREHENSIVE HEARING TEST: CPT

## 2023-03-01 PROCEDURE — G0268 REMOVAL OF IMPACTED WAX MD: CPT

## 2023-03-01 PROCEDURE — 99214 OFFICE O/P EST MOD 30 MIN: CPT | Mod: 25

## 2023-03-01 PROCEDURE — 99215 OFFICE O/P EST HI 40 MIN: CPT | Mod: 25

## 2023-03-01 PROCEDURE — 92567 TYMPANOMETRY: CPT

## 2023-03-01 RX ORDER — AZELASTINE HYDROCHLORIDE 137 UG/1
0.1 SPRAY, METERED NASAL TWICE DAILY
Qty: 1 | Refills: 6 | Status: COMPLETED | COMMUNITY
Start: 2022-09-14 | End: 2023-03-01

## 2023-03-01 RX ORDER — AZELASTINE HYDROCHLORIDE 137 UG/1
0.1 SPRAY, METERED NASAL TWICE DAILY
Qty: 1 | Refills: 6 | Status: COMPLETED | COMMUNITY
Start: 2021-07-21 | End: 2023-03-01

## 2023-03-01 RX ORDER — AZELASTINE HYDROCHLORIDE 137 UG/1
0.1 SPRAY, METERED NASAL TWICE DAILY
Qty: 1 | Refills: 6 | Status: COMPLETED | COMMUNITY
Start: 2022-03-16 | End: 2023-03-01

## 2023-03-01 NOTE — CONSULT LETTER
[Dear  ___] : Dear  [unfilled], [Courtesy Letter:] : I had the pleasure of seeing your patient, [unfilled], in my office today. [Please see my note below.] : Please see my note below. [Consult Closing:] : Thank you very much for allowing me to participate in the care of this patient.  If you have any questions, please do not hesitate to contact me. [Sincerely,] : Sincerely, [FreeTextEntry3] : Tripp Herman MD, JAROCHO, FACS\par  Department Otolaryngology\par Director of [facility]\par Professor of Otolaryngology, \par [employer] [facility] [facility] [employer]

## 2023-03-01 NOTE — PROCEDURE
[Image(s) Captured] : image(s) captured and filed [Video Captured] : video captured and filed [Flexible Endoscope] : examined with the flexible endoscope [Anterior rhinoscopy insufficient to account for symptoms] : anterior rhinoscopy insufficient to account for symptoms [Topical Lidocaine] : topical lidocaine [Oxymetazoline HCl] : oxymetazoline HCl [Serial Number: ___] : Serial Number: [unfilled] [___ % Obstructed] : [unfilled]U% obstructed [Deviated to the Lt] : deviated to the left [Nasal Septum] : no lesions [Nasal Septum Mucosa Bleeding] : no bleeding [Normal] : the middle meatus had no abnormalities [Risk and Benefits Discussed] : The purpose, risks, discomforts, benefits and alternatives of the procedure have been explained to the patient including no treatment. [Cerumen Impaction] : Cerumen Impaction [Same] : same as the Pre Op Dx. [] : Removal of Cerumen [FreeTextEntry6] : Pre-op indication(s): post nasal drip\par Post-op indication(s): L deviated septum, turbinate hypertrophy\par Verbal consent obtained from patient.\par “Anterior rhinoscopy insufficient to account for symptoms” \par Details for procedure: \par Scope #: 202\par Type of scope:    flexible fiber optic telescope  X   Rigid glass telescope \par Anesthesia and/or vasoconstriction was achieved topically by using: \par 4% Lidocaine spray   0.05% Oxymetazoline     Other ______ \par The following anatomic sites were directly examined in a sequential fashion: \par The scope was introduced in the nasal passage between the middle and inferior turbinates to exam the inferior portion of the middle meatus and the fontanelle, as well as the maxillary ostia. Next, the scope was passed medially and posteriorly to the middle turbinates to examine the sphenoethmoid recess and the superior turbinate region. \par Upon visualization the finders are as follows: \par Nasal Septum:   Deviated to   left   \par Bleeding site cauterized:    Anterior   left   right   Posterior   left   right \par Method:   Silver Nitrate   YAG Laser    Electrocautery ______ \par Right Side: \par * Mucosa: Normal\par * Mucous: Normal\par * Polyp: Normal\par * Inferior Turbinate: hypertrophy\par * Middle Turbinate: Normal\par * Superior Turbinate: Normal\par * Inferior Meatus: Normal\par * Middle Meatus: Normal\par * Super Meatus: Normal\par * Sphenoethmoidal Recess: Normal\par Left Side: \par * Mucosa: Normal\par * Mucous: Normal\par * Polyp: Normal\par * Inferior Turbinate: hypertrophy\par * Middle Turbinate: Normal\par * Superior Turbinate: Normal\par * Inferior Meatus: Normal\par * Middle Meatus: Normal\par * Super Meatus: Normal\par * Sphenoethmoidal Recess: Normal\par The patient tolerated the procedure well without any complications.\par \par   [de-identified] : Scope 202 [FreeTextEntry1] : Pt has clogged ears R worse than L. [FreeTextEntry5] : Bilateral cerumen impaction was removed under magnification using a combination of irrigation, ear speculum and wax loops.

## 2023-03-01 NOTE — PHYSICAL EXAM
[] : septum deviated to the left [Midline] : trachea located in midline position [Normal] : no rashes [FreeTextEntry1] : Vertigo [de-identified] : Hyoid bone palpable [de-identified] : Bilateral cerumen impaction, R worse than L [de-identified] : hypertrophy

## 2023-03-01 NOTE — HISTORY OF PRESENT ILLNESS
[Hearing Loss] : hearing loss [Ear Fullness] : ear fullness [Otorrhea] : otorrhea [de-identified] : 89 year old female presents for follow up of right ear fullness. Reports still feeling some right ear fullness. Went to Urgent Care in February, but no findings. 2 days ago, used ear drops to soften ear wax, but stopped for vertigo symptoms. Patient denies otalgia, otorrhea, ear infections, tinnitus. Reports intermittent post nasal drip. Not using nasal sprays at this time due to constipation side effects. Denies nasal congestion, nasal discharge, sinus pressure/pain, or recent fevers.

## 2023-03-01 NOTE — REASON FOR VISIT
[Subsequent Evaluation] : a subsequent evaluation for [FreeTextEntry2] : Bleeding in left ear and right ear fullness  , continued PND

## 2023-05-30 ENCOUNTER — NON-APPOINTMENT (OUTPATIENT)
Age: 88
End: 2023-05-30

## 2023-05-30 ENCOUNTER — APPOINTMENT (OUTPATIENT)
Dept: OPHTHALMOLOGY | Facility: CLINIC | Age: 88
End: 2023-05-30
Payer: MEDICARE

## 2023-05-30 PROCEDURE — 92012 INTRM OPH EXAM EST PATIENT: CPT

## 2023-08-01 ENCOUNTER — NON-APPOINTMENT (OUTPATIENT)
Age: 88
End: 2023-08-01

## 2023-08-01 ENCOUNTER — APPOINTMENT (OUTPATIENT)
Dept: OPHTHALMOLOGY | Facility: CLINIC | Age: 88
End: 2023-08-01
Payer: MEDICARE

## 2023-08-01 PROCEDURE — 92012 INTRM OPH EXAM EST PATIENT: CPT

## 2023-08-11 ENCOUNTER — APPOINTMENT (OUTPATIENT)
Dept: OTOLARYNGOLOGY | Facility: CLINIC | Age: 88
End: 2023-08-11
Payer: MEDICARE

## 2023-08-11 VITALS
DIASTOLIC BLOOD PRESSURE: 64 MMHG | WEIGHT: 112.5 LBS | HEART RATE: 91 BPM | HEIGHT: 65 IN | BODY MASS INDEX: 18.74 KG/M2 | OXYGEN SATURATION: 98 % | SYSTOLIC BLOOD PRESSURE: 128 MMHG

## 2023-08-11 DIAGNOSIS — H52.209 UNSPECIFIED ASTIGMATISM, UNSPECIFIED EYE: ICD-10-CM

## 2023-08-11 DIAGNOSIS — H81.10 BENIGN PAROXYSMAL VERTIGO, UNSPECIFIED EAR: ICD-10-CM

## 2023-08-11 DIAGNOSIS — H91.93 UNSPECIFIED HEARING LOSS, BILATERAL: ICD-10-CM

## 2023-08-11 PROCEDURE — G0268 REMOVAL OF IMPACTED WAX MD: CPT

## 2023-08-11 PROCEDURE — 92567 TYMPANOMETRY: CPT

## 2023-08-11 PROCEDURE — 99214 OFFICE O/P EST MOD 30 MIN: CPT | Mod: 25

## 2023-08-11 PROCEDURE — 92557 COMPREHENSIVE HEARING TEST: CPT

## 2023-08-11 RX ORDER — FLUTICASONE PROPIONATE 50 UG/1
50 SPRAY, METERED NASAL DAILY
Qty: 1 | Refills: 5 | Status: ACTIVE | COMMUNITY
Start: 2023-08-11 | End: 1900-01-01

## 2023-08-11 RX ORDER — ONABOTULINUMTOXINA 100 [USP'U]/1
INJECTION, POWDER, LYOPHILIZED, FOR SOLUTION INTRADERMAL; INTRAMUSCULAR
Refills: 0 | Status: ACTIVE | COMMUNITY

## 2023-08-11 RX ORDER — APIXABAN 5 MG/1
TABLET, FILM COATED ORAL
Refills: 0 | Status: ACTIVE | COMMUNITY

## 2023-08-11 NOTE — PHYSICAL EXAM
[] : septum deviated to the left [Midline] : trachea located in midline position [Normal] : no rashes [FreeTextEntry1] : sudden R hearing loss, vertigo [de-identified] : Hyoid bone palpable [de-identified] : Bilateral cerumen impaction removed [de-identified] : hypertrophy

## 2023-08-11 NOTE — HISTORY OF PRESENT ILLNESS
[de-identified] : 89 year old female presents for sudden right hearing loss a few weeks ago. States had another blood clot in her lung, about 2 months ago, went to [place] and currently on Eliquis, followed by pulmonologist. Denies head or otologic trauma. Denies otalgia, otorrhea, ear infections, tinnitus. Last audiogram 3/1/23.

## 2023-08-11 NOTE — PROCEDURE
[Risk and Benefits Discussed] : The purpose, risks, discomforts, benefits and alternatives of the procedure have been explained to the patient including no treatment. [Cerumen Impaction] : Cerumen Impaction [Same] : same as the Pre Op Dx. [] : Removal of Cerumen [FreeTextEntry1] : Pt has clogged ears [FreeTextEntry5] : Cerumen impaction was removed under magnification using ear speculum and wax loop.

## 2023-08-11 NOTE — REASON FOR VISIT
[Subsequent Evaluation] : a subsequent evaluation for [FreeTextEntry2] : sudden right hearing loss.

## 2024-02-02 ENCOUNTER — APPOINTMENT (OUTPATIENT)
Dept: OTOLARYNGOLOGY | Facility: CLINIC | Age: 89
End: 2024-02-02

## 2024-03-21 ENCOUNTER — APPOINTMENT (OUTPATIENT)
Age: 89
End: 2024-03-21
Payer: MEDICARE

## 2024-03-21 PROCEDURE — 99203 OFFICE O/P NEW LOW 30 MIN: CPT

## 2024-03-22 ENCOUNTER — APPOINTMENT (OUTPATIENT)
Dept: OTOLARYNGOLOGY | Facility: CLINIC | Age: 89
End: 2024-03-22
Payer: MEDICARE

## 2024-03-22 VITALS
WEIGHT: 109 LBS | HEIGHT: 64 IN | DIASTOLIC BLOOD PRESSURE: 74 MMHG | SYSTOLIC BLOOD PRESSURE: 119 MMHG | HEART RATE: 91 BPM | BODY MASS INDEX: 18.61 KG/M2

## 2024-03-22 DIAGNOSIS — H91.93 UNSPECIFIED HEARING LOSS, BILATERAL: ICD-10-CM

## 2024-03-22 DIAGNOSIS — H69.90 UNSPECIFIED EUSTACHIAN TUBE DISORDER, UNSPECIFIED EAR: ICD-10-CM

## 2024-03-22 DIAGNOSIS — Z86.39 PERSONAL HISTORY OF OTHER ENDOCRINE, NUTRITIONAL AND METABOLIC DISEASE: ICD-10-CM

## 2024-03-22 DIAGNOSIS — H60.90 UNSPECIFIED OTITIS EXTERNA, UNSPECIFIED EAR: ICD-10-CM

## 2024-03-22 DIAGNOSIS — Z80.3 FAMILY HISTORY OF MALIGNANT NEOPLASM OF BREAST: ICD-10-CM

## 2024-03-22 DIAGNOSIS — H92.02 OTALGIA, LEFT EAR: ICD-10-CM

## 2024-03-22 DIAGNOSIS — H61.21 IMPACTED CERUMEN, RIGHT EAR: ICD-10-CM

## 2024-03-22 DIAGNOSIS — Z87.891 PERSONAL HISTORY OF NICOTINE DEPENDENCE: ICD-10-CM

## 2024-03-22 DIAGNOSIS — J34.89 OTHER SPECIFIED DISORDERS OF NOSE AND NASAL SINUSES: ICD-10-CM

## 2024-03-22 DIAGNOSIS — R09.82 POSTNASAL DRIP: ICD-10-CM

## 2024-03-22 PROCEDURE — 92557 COMPREHENSIVE HEARING TEST: CPT

## 2024-03-22 PROCEDURE — 92567 TYMPANOMETRY: CPT

## 2024-03-22 PROCEDURE — 99214 OFFICE O/P EST MOD 30 MIN: CPT | Mod: 25

## 2024-03-22 PROCEDURE — 92511 NASOPHARYNGOSCOPY: CPT

## 2024-03-22 PROCEDURE — G0268 REMOVAL OF IMPACTED WAX MD: CPT

## 2024-03-22 RX ORDER — FLUTICASONE PROPIONATE 50 UG/1
50 SPRAY, METERED NASAL DAILY
Qty: 1 | Refills: 2 | Status: ACTIVE | COMMUNITY
Start: 2024-03-22 | End: 1900-01-01

## 2024-03-22 NOTE — PHYSICAL EXAM
[] : septum deviated to the left [Midline] : trachea located in midline position [Normal] : no rashes [FreeTextEntry1] : sudden R hearing loss, vertigo [de-identified] : Hyoid bone palpable [de-identified] : Bilateral cerumen impaction removed [de-identified] : hypertrophy

## 2024-03-22 NOTE — ADDENDUM
[FreeTextEntry1] : Scribe Attestation: I, Jassi Camacho, am scribing for and in the presence of Dr. Tripp Herman in the following sections HISTORY OF PRESENT ILLNESS, PAST MEDICAL/FAMILY/SOCIAL HISTORY; REVIEW OF SYSTEMS; VITAL SIGNS; PHYSICAL EXAM; PROCEDURES; DISCUSSION.   I, Dr. Tripp Herman, personally performed the services described in the documentation, reviewed the documentation recorded by the scribe in my presence, and it accurately and completely records my words and actions.

## 2024-03-22 NOTE — REASON FOR VISIT
[Subsequent Evaluation] : a subsequent evaluation for [FreeTextEntry2] : clogged ears and rhinorrhea

## 2024-03-22 NOTE — HISTORY OF PRESENT ILLNESS
[de-identified] : 89 year old female with history of hearing loss. Presents today for evaluation of clogged ears and rhinorrhea. Reports clogged sensation in the right ear, postnasal drip and frequent clear rhinorrhea in the morning. States this began after having COVID in January. No current use of nasal sprays. Denies nasal congestion and facial pain pressure. Denies otalgia, otorrhea and recently treated infections.

## 2024-03-22 NOTE — CONSULT LETTER
[Dear  ___] : Dear  [unfilled], [Consult Letter:] : I had the pleasure of evaluating your patient, [unfilled]. [Please see my note below.] : Please see my note below. [Consult Closing:] : Thank you very much for allowing me to participate in the care of this patient.  If you have any questions, please do not hesitate to contact me. [Sincerely,] : Sincerely, [FreeTextEntry2] : Dr. Wilda Zafar [FreeTextEntry3] : Tripp Herman MD, JAROCHO, FACS  Department Otolaryngology Director of [facility] Professor of Otolaryngology,  [employer] [facility] [facility]

## 2024-03-22 NOTE — PROCEDURE
[Recalcitrant Symptoms] : recalcitrant symptoms  [Anterior rhinoscopy insufficient to account for symptoms] : anterior rhinoscopy insufficient to account for symptoms [Oxymetazoline HCl] : oxymetazoline HCl [Topical Lidocaine] : topical lidocaine [Serial Number: ___] : Serial Number: [unfilled] [Flexible Endoscope] : examined with the flexible endoscope [Congested] : congested [Deviated to the Lt] : deviated to the left [Normal] : the paranasal sinuses had no abnormalities [Risk and Benefits Discussed] : The purpose, risks, discomforts, benefits and alternatives of the procedure have been explained to the patient including no treatment. [Same] : same as the Pre Op Dx. [Cerumen Impaction] : Cerumen Impaction [] : Removal of Cerumen [FreeTextEntry6] : Pre-op indication(s): rhinorrhea Post-op indication(s): deviated septum to the L, turbinate hypertrophy, no polyps Verbal consent obtained from patient. Anterior rhinoscopy insufficient to account for symptoms  Details for procedure:  Scope #: 206 Type of scope:    flexible fiber optic telescope    Anesthesia and/or vasoconstriction was achieved topically by usin% Lidocaine spray   0.05% Oxymetazoline     Other ______  The following anatomic sites were directly examined in a sequential fashion:  The scope was introduced in the nasal passage between the middle and inferior turbinates to exam the inferior portion of the middle meatus and the fontanelle, as well as the maxillary ostia. Next, the scope was passed medially and posteriorly to the middle turbinates to examine the sphenoethmoid recess and the superior turbinate region.  Upon visualization the finders are as follows:  Nasal Septum:   Deviated to   left     Bleeding site cauterized:    Anterior   left   right   Posterior   left   right  Method:   Silver Nitrate   YAG Laser    Electrocautery ______  Right Side:  * Mucosa: Normal * Mucous: Normal * Polyp: Normal * Inferior Turbinate: hypertrophy * Middle Turbinate: Normal * Superior Turbinate: Normal * Inferior Meatus: Normal * Middle Meatus: Normal * Super Meatus: Normal * Sphenoethmoidal Recess: Normal Left Side:  * Mucosa: Normal * Mucous: Normal * Polyp: Normal * Inferior Turbinate: hypertrophy * Middle Turbinate: Normal * Superior Turbinate: Normal * Inferior Meatus: Normal * Middle Meatus: Normal * Super Meatus: Normal * Sphenoethmoidal Recess: Normal The patient tolerated the procedure well without any complications.    [FreeTextEntry5] : Cerumen impaction debrided using irrigation and instrumentation (wax loop and alligator forceps) [FreeTextEntry1] : Pt has clogged ears

## 2024-03-25 ENCOUNTER — APPOINTMENT (OUTPATIENT)
Dept: OPHTHALMOLOGY | Facility: CLINIC | Age: 89
End: 2024-03-25
Payer: MEDICARE

## 2024-03-25 ENCOUNTER — NON-APPOINTMENT (OUTPATIENT)
Age: 89
End: 2024-03-25

## 2024-03-25 PROCEDURE — 92012 INTRM OPH EXAM EST PATIENT: CPT

## 2024-06-20 ENCOUNTER — APPOINTMENT (OUTPATIENT)
Age: 89
End: 2024-06-20
Payer: MEDICARE

## 2024-06-20 PROCEDURE — 99213 OFFICE O/P EST LOW 20 MIN: CPT

## 2024-08-08 ENCOUNTER — NON-APPOINTMENT (OUTPATIENT)
Age: 89
End: 2024-08-08

## 2024-08-09 ENCOUNTER — APPOINTMENT (OUTPATIENT)
Dept: OTOLARYNGOLOGY | Facility: CLINIC | Age: 89
End: 2024-08-09

## 2024-08-09 PROBLEM — R05.8 DRY COUGH: Status: ACTIVE | Noted: 2019-05-17

## 2024-08-09 PROBLEM — H93.8X3 CLOGGED EAR, BILATERAL: Status: ACTIVE | Noted: 2024-08-09

## 2024-08-09 PROCEDURE — 99214 OFFICE O/P EST MOD 30 MIN: CPT | Mod: 25

## 2024-08-09 PROCEDURE — G0268 REMOVAL OF IMPACTED WAX MD: CPT

## 2024-08-09 NOTE — PROCEDURE
[Risk and Benefits Discussed] : The purpose, risks, discomforts, benefits and alternatives of the procedure have been explained to the patient including no treatment. [Cerumen Impaction] : Cerumen Impaction [Same] : same as the Pre Op Dx. [] : Removal of Cerumen [FreeTextEntry1] : bilateral clogged ears [FreeTextEntry6] : Bilateral cerumen impaction debrided using irrigation.

## 2024-08-09 NOTE — ADDENDUM
[FreeTextEntry1] :  scribe attestation; I, Martir Archibald, am scribing for and in the presence of Dr. Tripp Herman in the following sections HISTORY OF PRESENT ILLNESS, PAST MEDICAL/FAMILY/SOCIAL HISTORY; REVIEW OF SYSTEMS; VITAL SIGNS; PHYSICAL EXAM; PROCEDURES; DISCUSSION.   I, Dr. Tripp Herman, personally performed the services described in the documentation, reviewed the documentation recorded by the scribe in my presence, and it accurately and completely records my words and actions.

## 2024-08-09 NOTE — HISTORY OF PRESENT ILLNESS
[de-identified] : 89 year old female follow up clogged ears.  States occasional bilateral otalgia.

## 2024-08-09 NOTE — PHYSICAL EXAM
[] : septum deviated to the left [Midline] : trachea located in midline position [Normal] : no rashes [FreeTextEntry1] : sudden R hearing loss, vertigo [de-identified] : Hyoid bone palpable [de-identified] : Bilateral cerumen impaction removed [de-identified] : hypertrophy

## 2024-09-20 ENCOUNTER — APPOINTMENT (OUTPATIENT)
Dept: OTOLARYNGOLOGY | Facility: CLINIC | Age: 89
End: 2024-09-20
Payer: MEDICARE

## 2024-09-20 VITALS
RESPIRATION RATE: 17 BRPM | HEART RATE: 77 BPM | WEIGHT: 112 LBS | OXYGEN SATURATION: 98 % | HEIGHT: 64 IN | BODY MASS INDEX: 19.12 KG/M2

## 2024-09-20 VITALS
SYSTOLIC BLOOD PRESSURE: 127 MMHG | HEART RATE: 76 BPM | DIASTOLIC BLOOD PRESSURE: 70 MMHG | OXYGEN SATURATION: 98 % | RESPIRATION RATE: 17 BRPM

## 2024-09-20 DIAGNOSIS — R04.0 EPISTAXIS: ICD-10-CM

## 2024-09-20 DIAGNOSIS — Z80.0 FAMILY HISTORY OF MALIGNANT NEOPLASM OF DIGESTIVE ORGANS: ICD-10-CM

## 2024-09-20 DIAGNOSIS — H91.93 UNSPECIFIED HEARING LOSS, BILATERAL: ICD-10-CM

## 2024-09-20 DIAGNOSIS — H81.10 BENIGN PAROXYSMAL VERTIGO, UNSPECIFIED EAR: ICD-10-CM

## 2024-09-20 DIAGNOSIS — H61.21 IMPACTED CERUMEN, RIGHT EAR: ICD-10-CM

## 2024-09-20 DIAGNOSIS — H92.02 OTALGIA, LEFT EAR: ICD-10-CM

## 2024-09-20 DIAGNOSIS — R09.82 POSTNASAL DRIP: ICD-10-CM

## 2024-09-20 DIAGNOSIS — Z80.3 FAMILY HISTORY OF MALIGNANT NEOPLASM OF BREAST: ICD-10-CM

## 2024-09-20 DIAGNOSIS — H93.8X3 OTHER SPECIFIED DISORDERS OF EAR, BILATERAL: ICD-10-CM

## 2024-09-20 DIAGNOSIS — Z86.39 PERSONAL HISTORY OF OTHER ENDOCRINE, NUTRITIONAL AND METABOLIC DISEASE: ICD-10-CM

## 2024-09-20 DIAGNOSIS — J34.89 OTHER SPECIFIED DISORDERS OF NOSE AND NASAL SINUSES: ICD-10-CM

## 2024-09-20 DIAGNOSIS — R42 DIZZINESS AND GIDDINESS: ICD-10-CM

## 2024-09-20 DIAGNOSIS — Z83.511 FAMILY HISTORY OF GLAUCOMA: ICD-10-CM

## 2024-09-20 DIAGNOSIS — R05.8 OTHER SPECIFIED COUGH: ICD-10-CM

## 2024-09-20 PROCEDURE — 99214 OFFICE O/P EST MOD 30 MIN: CPT | Mod: 25

## 2024-09-20 PROCEDURE — G0268 REMOVAL OF IMPACTED WAX MD: CPT

## 2024-09-20 PROCEDURE — 31231 NASAL ENDOSCOPY DX: CPT

## 2024-09-20 NOTE — HISTORY OF PRESENT ILLNESS
[de-identified] : 89 year old female presents with follow up for PND and clogged ears. Continues to wear b/l hearing aids  She was last seen 08/09/24 at which time she was started cipro-dex drops and fluticasone  She completed course of cipro and only used flonase twice  States since the last visit she has been feeling intermittent dizziness  States right ear feels clogged.  Patient denies otalgia, otorrhea, ear infections, ear fullness, bleeding, ear surgeries, tinnitus, dizziness, vertigo, headaches related to hearing.  Patient has tried Flonase, but states it constipated her.

## 2024-09-20 NOTE — CONSULT LETTER
[Dear  ___] : Dear  [unfilled], [Courtesy Letter:] : I had the pleasure of seeing your patient, [unfilled], in my office today. [Please see my note below.] : Please see my note below. [Consult Closing:] : Thank you very much for allowing me to participate in the care of this patient.  If you have any questions, please do not hesitate to contact me. [Sincerely,] : Sincerely, [FreeTextEntry2] : Wilda Zafar MD  [FreeTextEntry3] : Tripp Herman MD, JAROCHO, FACS  Department Otolaryngology Director of [facility] Professor of Otolaryngology, [employer] [facility]

## 2024-09-20 NOTE — PHYSICAL EXAM
[] : septum deviated to the left [Midline] : trachea located in midline position [Normal] : no rashes [FreeTextEntry1] : PND and clogged ears.  [de-identified] : Bilateral cerumen impaction removed [de-identified] : hypertrophy

## 2024-09-20 NOTE — PROCEDURE
[Recalcitrant Symptoms] : recalcitrant symptoms  [Anterior rhinoscopy insufficient to account for symptoms] : anterior rhinoscopy insufficient to account for symptoms [Normal] : the paranasal sinuses had no abnormalities [Risk and Benefits Discussed] : The purpose, risks, discomforts, benefits and alternatives of the procedure have been explained to the patient including no treatment. [Cerumen Impaction] : Cerumen Impaction [Same] : same as the Pre Op Dx. [] : Removal of Cerumen [FreeTextEntry6] : Pre-op indication(s): PND Post-op indication(s): PND Verbal consent obtained from patient. Anterior rhinoscopy insufficient to account for symptoms  Details for procedure:  Scope #: 211 Type of scope:    flexible fiber optic telescope     Anesthesia and/or vasoconstriction was achieved topically by usin% Lidocaine spray   0.05% Oxymetazoline     Other ______  The following anatomic sites were directly examined in a sequential fashion:  The scope was introduced in the nasal passage between the middle and inferior turbinates to exam the inferior portion of the middle meatus and the fontanelle, as well as the maxillary ostia. Next, the scope was passed medially and posteriorly to the middle turbinates to examine the sphenoethmoid recess and the superior turbinate region.  Upon visualization the finders are as follows:  Nasal Septum: Deviated to the left Bleeding site cauterized:    Anterior   left   right   Posterior   left   right  Method:   Silver Nitrate   YAG Laser    Electrocautery ______  Right Side:  * Mucosa: Normal * Mucous: Normal * Polyp: Normal * Inferior Turbinate: Normal * Middle Turbinate: Normal * Superior Turbinate: Normal * Inferior Meatus: Normal * Middle Meatus: Normal * Super Meatus: Normal * Sphenoethmoidal Recess: Normal Left Side:  * Mucosa: Normal * Mucous: Normal * Polyp: Normal * Inferior Turbinate: Normal * Middle Turbinate: Normal * Superior Turbinate: Normal * Inferior Meatus: Normal * Middle Meatus: Normal * Super Meatus: Normal * Sphenoethmoidal Recess: Normal The patient tolerated the procedure well without any complications.    [FreeTextEntry5] : cerumen impaction removed using irrigation.

## 2024-09-27 ENCOUNTER — APPOINTMENT (OUTPATIENT)
Dept: OTOLARYNGOLOGY | Facility: CLINIC | Age: 89
End: 2024-09-27

## 2024-09-27 ENCOUNTER — NON-APPOINTMENT (OUTPATIENT)
Age: 89
End: 2024-09-27

## 2025-02-07 ENCOUNTER — APPOINTMENT (OUTPATIENT)
Dept: OTOLARYNGOLOGY | Facility: CLINIC | Age: 89
End: 2025-02-07
Payer: MEDICARE

## 2025-02-07 ENCOUNTER — NON-APPOINTMENT (OUTPATIENT)
Age: 89
End: 2025-02-07

## 2025-02-07 VITALS — BODY MASS INDEX: 19.12 KG/M2 | HEIGHT: 64 IN | WEIGHT: 112 LBS

## 2025-02-07 VITALS — HEART RATE: 78 BPM | DIASTOLIC BLOOD PRESSURE: 73 MMHG | SYSTOLIC BLOOD PRESSURE: 122 MMHG

## 2025-02-07 DIAGNOSIS — H91.93 UNSPECIFIED HEARING LOSS, BILATERAL: ICD-10-CM

## 2025-02-07 DIAGNOSIS — H93.8X3 OTHER SPECIFIED DISORDERS OF EAR, BILATERAL: ICD-10-CM

## 2025-02-07 DIAGNOSIS — Z80.3 FAMILY HISTORY OF MALIGNANT NEOPLASM OF BREAST: ICD-10-CM

## 2025-02-07 DIAGNOSIS — Z80.0 FAMILY HISTORY OF MALIGNANT NEOPLASM OF DIGESTIVE ORGANS: ICD-10-CM

## 2025-02-07 DIAGNOSIS — Z83.511 FAMILY HISTORY OF GLAUCOMA: ICD-10-CM

## 2025-02-07 DIAGNOSIS — H61.21 IMPACTED CERUMEN, RIGHT EAR: ICD-10-CM

## 2025-02-07 DIAGNOSIS — Z87.19 PERSONAL HISTORY OF OTHER DISEASES OF THE DIGESTIVE SYSTEM: ICD-10-CM

## 2025-02-07 DIAGNOSIS — H69.90 UNSPECIFIED EUSTACHIAN TUBE DISORDER, UNSPECIFIED EAR: ICD-10-CM

## 2025-02-07 DIAGNOSIS — H60.90 UNSPECIFIED OTITIS EXTERNA, UNSPECIFIED EAR: ICD-10-CM

## 2025-02-07 DIAGNOSIS — H92.02 OTALGIA, LEFT EAR: ICD-10-CM

## 2025-02-07 DIAGNOSIS — R42 DIZZINESS AND GIDDINESS: ICD-10-CM

## 2025-02-07 DIAGNOSIS — R04.0 EPISTAXIS: ICD-10-CM

## 2025-02-07 DIAGNOSIS — R09.82 POSTNASAL DRIP: ICD-10-CM

## 2025-02-07 DIAGNOSIS — Z86.39 PERSONAL HISTORY OF OTHER ENDOCRINE, NUTRITIONAL AND METABOLIC DISEASE: ICD-10-CM

## 2025-02-07 PROCEDURE — 99214 OFFICE O/P EST MOD 30 MIN: CPT | Mod: 25

## 2025-02-07 PROCEDURE — G0268 REMOVAL OF IMPACTED WAX MD: CPT

## 2025-02-07 RX ORDER — CIPROFLOXACIN AND DEXAMETHASONE 3; 1 MG/ML; MG/ML
0.3-0.1 SUSPENSION/ DROPS AURICULAR (OTIC) TWICE DAILY
Qty: 11 | Refills: 2 | Status: ACTIVE | COMMUNITY
Start: 2025-02-07 | End: 1900-01-01

## 2025-02-22 ENCOUNTER — NON-APPOINTMENT (OUTPATIENT)
Age: 89
End: 2025-02-22

## 2025-03-21 ENCOUNTER — NON-APPOINTMENT (OUTPATIENT)
Age: 89
End: 2025-03-21

## 2025-06-06 NOTE — HISTORY OF PRESENT ILLNESS
Just FYI [de-identified] : 89 year old female follow up bilateral hearing loss, right ear feels hearing has declined.  States 2 - 3 weeks right ear hearing got a lot worse and has stayed that way.  States has been using debrox over the last 2 days with no improvement.   Denies otorrhea.

## 2025-07-25 ENCOUNTER — APPOINTMENT (OUTPATIENT)
Dept: OTOLARYNGOLOGY | Facility: CLINIC | Age: 89
End: 2025-07-25
Payer: MEDICARE

## 2025-07-25 VITALS
HEART RATE: 80 BPM | DIASTOLIC BLOOD PRESSURE: 70 MMHG | BODY MASS INDEX: 19.12 KG/M2 | HEIGHT: 64 IN | SYSTOLIC BLOOD PRESSURE: 117 MMHG | WEIGHT: 112 LBS

## 2025-07-25 DIAGNOSIS — Z87.19 PERSONAL HISTORY OF OTHER DISEASES OF THE DIGESTIVE SYSTEM: ICD-10-CM

## 2025-07-25 DIAGNOSIS — H93.8X3 OTHER SPECIFIED DISORDERS OF EAR, BILATERAL: ICD-10-CM

## 2025-07-25 DIAGNOSIS — H91.93 UNSPECIFIED HEARING LOSS, BILATERAL: ICD-10-CM

## 2025-07-25 DIAGNOSIS — H60.90 UNSPECIFIED OTITIS EXTERNA, UNSPECIFIED EAR: ICD-10-CM

## 2025-07-25 DIAGNOSIS — R04.0 EPISTAXIS: ICD-10-CM

## 2025-07-25 DIAGNOSIS — R05.8 OTHER SPECIFIED COUGH: ICD-10-CM

## 2025-07-25 DIAGNOSIS — Z86.39 PERSONAL HISTORY OF OTHER ENDOCRINE, NUTRITIONAL AND METABOLIC DISEASE: ICD-10-CM

## 2025-07-25 DIAGNOSIS — H69.90 UNSPECIFIED EUSTACHIAN TUBE DISORDER, UNSPECIFIED EAR: ICD-10-CM

## 2025-07-25 DIAGNOSIS — K21.9 GASTRO-ESOPHAGEAL REFLUX DISEASE W/OUT ESOPHAGITIS: ICD-10-CM

## 2025-07-25 PROCEDURE — 99214 OFFICE O/P EST MOD 30 MIN: CPT | Mod: 25

## 2025-07-25 PROCEDURE — G0268 REMOVAL OF IMPACTED WAX MD: CPT

## 2025-07-25 RX ORDER — CIPROFLOXACIN AND DEXAMETHASONE 3; 1 MG/ML; MG/ML
0.3-0.1 SUSPENSION/ DROPS AURICULAR (OTIC) TWICE DAILY
Qty: 6 | Refills: 2 | Status: ACTIVE | COMMUNITY
Start: 2025-07-25 | End: 1900-01-01